# Patient Record
(demographics unavailable — no encounter records)

---

## 2024-12-11 NOTE — ASSESSMENT
[FreeTextEntry1] : Patient status post septal fess exactly a year ago had a recent bout of sinus acute sinusitis with a lot of purulence started Augmentin got better but then she stopped the Augmentin after couple of days and then the symptoms recurred she is doing lavage getting a lot of drainage endoscopically a lot of pus and purulence was suctioned out all consistent with acute sinusitis I put her on a refill of her Augmentin fully expect her symptoms to resolve.

## 2024-12-11 NOTE — REASON FOR VISIT
[Subsequent Evaluation] : a subsequent evaluation for [FreeTextEntry2] : sinus pressure and nasal congestion

## 2024-12-11 NOTE — END OF VISIT
[FreeTextEntry3] : I, Dr. Mcdowell personally performed the evaluation and management (E/M) services , including all procedures, for this established patient who presents today with (a) new problem(s)/exacerbation of (an) existing condition(s). That E/M includes conducting the clinically appropriate interval history &/or exam, assessing all new/exacerbated conditions, and establishing a new plan of care. Today, my GIOVANNY, Marily Munoz, was here to observe &/or participate in the visit & follow plan of care established by me.

## 2024-12-11 NOTE — HISTORY OF PRESENT ILLNESS
[de-identified] : Patient comes in almost 1 year s/p septoplasty and FESS. She had been doing well but then a few weeks ago started withi facial pain and pressure with nasal congestion. SHe went on a few days of augmentin but stopped it premautrely. She then started up again with nasal congestion and sinus pressure headaches  She started with her sinus rinses again

## 2025-02-14 NOTE — ASSESSMENT
[FreeTextEntry1] : Recent otitis media treated with antibiotics getting better still little lightheaded and dizzy has a history of arrhythmias on a Holter monitor currently could be either a her arrhythmia or little bit of labyrinthitis she 1 year status post septoplasty doing really well endoscopically very dry mucosa nasal mucosa encouraged her to continue using AYA R which she forgot help helps a lot follow-up with us as needed.

## 2025-02-14 NOTE — REVIEW OF SYSTEMS
[As Noted in HPI] : as noted in HPI [Ear Pain] : ear pain [Dizziness] : dizziness [Negative] : Heme/Lymph

## 2025-02-14 NOTE — PHYSICAL EXAM
[Nasal Endoscopy Performed] : nasal endoscopy was performed, see procedure section for findings [Midline] : trachea located in midline position [Normal] : no rashes [de-identified] : cerumen in the right ear canal,  once removed normal EACs

## 2025-02-14 NOTE — HISTORY OF PRESENT ILLNESS
[de-identified] : Patient is 1 year s/p FESS and is has having vertigo with getting up from laying down for about a week and a half. Went to the cardiologist and they have her on a holter monitor but she also saw her PCP who said she has some fluid in the left ear. She is on an antibiotic for the last few days.  She does feel better but is still feeling off. She did have pain in the left ear but that has resolved. No ringing in the ears  since this started  She did have some pressure in the left cheek when leaning forward  last week